# Patient Record
Sex: FEMALE | Race: BLACK OR AFRICAN AMERICAN | Employment: FULL TIME | URBAN - METROPOLITAN AREA
[De-identification: names, ages, dates, MRNs, and addresses within clinical notes are randomized per-mention and may not be internally consistent; named-entity substitution may affect disease eponyms.]

---

## 2021-11-01 LAB — HBA1C MFR BLD HPLC: 5.2 %

## 2022-04-25 ENCOUNTER — OFFICE VISIT (OUTPATIENT)
Dept: INTERNAL MEDICINE CLINIC | Age: 42
End: 2022-04-25
Payer: COMMERCIAL

## 2022-04-25 VITALS
RESPIRATION RATE: 20 BRPM | HEART RATE: 86 BPM | HEIGHT: 68 IN | BODY MASS INDEX: 29.86 KG/M2 | DIASTOLIC BLOOD PRESSURE: 97 MMHG | OXYGEN SATURATION: 99 % | TEMPERATURE: 98.3 F | SYSTOLIC BLOOD PRESSURE: 138 MMHG | WEIGHT: 197 LBS

## 2022-04-25 DIAGNOSIS — L98.9 SKIN ABNORMALITY: ICD-10-CM

## 2022-04-25 DIAGNOSIS — Z98.84 HISTORY OF ROUX-EN-Y GASTRIC BYPASS: Primary | ICD-10-CM

## 2022-04-25 DIAGNOSIS — Z80.0 FAMILY HISTORY OF COLON CANCER: ICD-10-CM

## 2022-04-25 DIAGNOSIS — E66.9 OBESITY (BMI 30-39.9): ICD-10-CM

## 2022-04-25 DIAGNOSIS — I10 PRIMARY HYPERTENSION: ICD-10-CM

## 2022-04-25 DIAGNOSIS — K21.9 GASTROESOPHAGEAL REFLUX DISEASE WITHOUT ESOPHAGITIS: ICD-10-CM

## 2022-04-25 DIAGNOSIS — E26.9 HYPERALDOSTERONISM (HCC): ICD-10-CM

## 2022-04-25 DIAGNOSIS — J30.9 ALLERGIC RHINITIS, UNSPECIFIED SEASONALITY, UNSPECIFIED TRIGGER: ICD-10-CM

## 2022-04-25 DIAGNOSIS — N18.2 STAGE 2 CHRONIC KIDNEY DISEASE: ICD-10-CM

## 2022-04-25 DIAGNOSIS — K64.4 EXTERNAL HEMORRHOID: ICD-10-CM

## 2022-04-25 PROCEDURE — 99204 OFFICE O/P NEW MOD 45 MIN: CPT | Performed by: INTERNAL MEDICINE

## 2022-04-25 RX ORDER — OMEPRAZOLE 20 MG/1
20 CAPSULE, DELAYED RELEASE ORAL DAILY
COMMUNITY
Start: 2022-03-10 | End: 2022-04-25 | Stop reason: SDUPTHER

## 2022-04-25 RX ORDER — LORATADINE 10 MG/1
10 TABLET ORAL DAILY
Qty: 90 TABLET | Refills: 0 | Status: SHIPPED | OUTPATIENT
Start: 2022-04-25 | End: 2022-06-17

## 2022-04-25 RX ORDER — MULTIVITAMIN
2 CAPSULE ORAL DAILY
COMMUNITY
End: 2022-06-17

## 2022-04-25 RX ORDER — OMEPRAZOLE 20 MG/1
20 CAPSULE, DELAYED RELEASE ORAL DAILY
Qty: 90 CAPSULE | Refills: 1 | Status: SHIPPED | OUTPATIENT
Start: 2022-04-25 | End: 2022-06-17

## 2022-04-25 RX ORDER — AMLODIPINE BESYLATE 5 MG/1
10 TABLET ORAL DAILY
COMMUNITY
Start: 2022-03-10 | End: 2022-04-25 | Stop reason: SDUPTHER

## 2022-04-25 RX ORDER — RESVERA/CHROM/GR.TEA/EGCG/DIG3 50MG-20MCG
1 CAPSULE ORAL EVERY OTHER DAY
COMMUNITY
End: 2022-06-17

## 2022-04-25 RX ORDER — SPIRONOLACTONE 100 MG/1
1 TABLET, FILM COATED ORAL DAILY
COMMUNITY
Start: 2022-03-10 | End: 2022-04-25 | Stop reason: SDUPTHER

## 2022-04-25 RX ORDER — SPIRONOLACTONE 100 MG/1
100 TABLET, FILM COATED ORAL DAILY
Qty: 90 TABLET | Refills: 0 | Status: SHIPPED | OUTPATIENT
Start: 2022-04-25 | End: 2022-07-24

## 2022-04-25 RX ORDER — PHENOLPHTHALEIN 90 MG
10 TABLET,CHEWABLE ORAL DAILY
COMMUNITY
Start: 2021-10-21 | End: 2022-04-25 | Stop reason: ALTCHOICE

## 2022-04-25 RX ORDER — AMLODIPINE BESYLATE 10 MG/1
10 TABLET ORAL DAILY
Qty: 90 TABLET | Refills: 1 | Status: SHIPPED | OUTPATIENT
Start: 2022-04-25 | End: 2022-10-22

## 2022-04-25 RX ORDER — ALBUTEROL SULFATE 90 UG/1
2 AEROSOL, METERED RESPIRATORY (INHALATION)
COMMUNITY
Start: 2021-10-06 | End: 2022-06-17 | Stop reason: SDUPTHER

## 2022-04-25 NOTE — PROGRESS NOTES
1. \"Have you been to the ER, urgent care clinic since your last visit? Hospitalized since your last visit? \"  Yes, [facility], [address]    2. \"Have you seen or consulted any other health care providers outside of the [address] [place] [address] since your last visit? \" No     3. For patients aged 39-70: Has the patient had a colonoscopy / FIT/ Cologuard? No      If the patient is female:    4. For patients aged 41-77: Has the patient had a mammogram within the past 2 years? No      5. For patients aged 21-65: Has the patient had a pap smear? Yes, Last PCP. Health Maintenance Due   Topic Date Due    Hepatitis C Screening  Never done    Depression Screen  Never done    Cervical cancer screen  Never done    Lipid Screen  Never done    COVID-19 Vaccine (3 - Booster for Pfizer series) 08/31/2021     Patient here today with Shaila Webb in the room. Patient here to establish care, Patient will need referrals, patient is c/o vomitting, patient has had bariatric done 10/21.

## 2022-04-25 NOTE — PROGRESS NOTES
Tamanna Vaz is a 39 y.o. female  Chief Complaint   Patient presents with    Establish Care    Medication Evaluation    Medication Refill    Skin Problem    Hemorrhoids     Visit Vitals  BP (!) 138/97   Pulse 86   Temp 98.3 °F (36.8 °C) (Temporal)   Resp 20   Ht 5' 7.5\" (1.715 m)   Wt 197 lb (89.4 kg)   SpO2 99%   BMI 30.40 kg/m²          HPI  MsKendrick Mccauley is a 38 yo female with history of Melia-en-Y gastric bypass, Diet controlled DM, CKD HTN, allergic rhinitis, GERD presents today to establish care and refill her medications. She is concerned about nausea and vomiting after the gastric bypass in October 2021, couldn't stand the smell of food. She is on omeprazole and advised to eat small frequent meals instead of large meals. She has a painful rectal swelling, she went to urgent care and was given lidocaine patch and steroid supository with minimal relief, she also had a family history of colon cancer, will refer to GI for colonoscopy and evaluation. She also reports flaking of her skins on face and hands, it appears dry skin vs vitamin/mineral deficiency, will give referral to Dermatology if it continues, she can continue the supplements and cerave craem    She was seen by endocrinology for hypokalemia and hyperaldosteronism, on spirolactone. She also reports 3 lumps in her breast that were cysts and she hasnt had mammogram  Last year which was normal and hasn't had pap smear in few years. She will find an ob gyn on her own. Family history    Maternal grandfather: colon cancer        Social History     Socioeconomic History    Marital status: UNKNOWN   Tobacco Use    Smoking status: Never Smoker    Smokeless tobacco: Never Used   Vaping Use    Vaping Use: Never used   Substance and Sexual Activity    Alcohol use: Not Currently    Drug use: Never    Sexual activity: Yes     Partners: Female     Birth control/protection: None      . History reviewed. No pertinent past medical history. Allergies   Allergen Reactions    Other Plant, Animal, Environmental Sneezing          ROS  Review of Systems   All other systems reviewed and are negative. EXAM  Physical Exam  Vitals reviewed. Constitutional:       General: She is not in acute distress. Appearance: Normal appearance. HENT:      Head: Normocephalic and atraumatic. Mouth/Throat:      Mouth: Mucous membranes are moist.      Pharynx: Oropharynx is clear. Eyes:      Extraocular Movements: Extraocular movements intact. Cardiovascular:      Rate and Rhythm: Normal rate and regular rhythm. Heart sounds: No murmur heard. No gallop. Pulmonary:      Effort: No respiratory distress. Breath sounds: Normal breath sounds. No wheezing or rales. Abdominal:      General: Bowel sounds are normal. There is no distension. Palpations: There is no mass. Tenderness: There is no abdominal tenderness. There is no guarding. Musculoskeletal:         General: No swelling, tenderness or deformity. Normal range of motion. Right lower leg: No edema. Left lower leg: No edema. Skin:     Findings: No lesion. Neurological:      General: No focal deficit present. Mental Status: She is alert. Motor: No weakness. Psychiatric:         Mood and Affect: Mood normal.         Health Maintenance Due   Topic Date Due    Hepatitis C Screening  Never done    Cervical cancer screen  Never done    COVID-19 Vaccine (3 - Booster for Pfizer series) 08/31/2021       ASSESSMENT/PLAN    Diagnoses and all orders for this visit:    1. History of Melia-en-Y gastric bypass  -     REFERRAL TO GENERAL SURGERY    2. External hemorrhoid  -     REFERRAL TO GASTROENTEROLOGY    3. Family history of colon cancer  -     REFERRAL TO GASTROENTEROLOGY    4. Skin abnormality  -     REFERRAL TO DERMATOLOGY    5. Primary hypertension  -     amLODIPine (NORVASC) 10 mg tablet; Take 1 Tablet by mouth daily for 180 days.   - spironolactone (ALDACTONE) 100 mg tablet; Take 1 Tablet by mouth daily for 90 days.  -     METABOLIC PANEL, BASIC; Future  -     LIPID PANEL; Future    6. Gastroesophageal reflux disease without esophagitis  -     omeprazole (PRILOSEC) 20 mg capsule; Take 1 Capsule by mouth daily for 180 days. 7. Hyperaldosteronism (HCC)  -     spironolactone (ALDACTONE) 100 mg tablet; Take 1 Tablet by mouth daily for 90 days. 8. Stage 2 chronic kidney disease  -     METABOLIC PANEL, BASIC; Future    9. Obesity (BMI 30-39.9)  -     LIPID PANEL; Future    10. Allergic rhinitis, unspecified seasonality, unspecified trigger  -     loratadine (CLARITIN) 10 mg tablet; Take 1 Tablet by mouth daily for 90 days.       -healthy life style , such as healthy balance low calorie diet and exercise advised   Return in 3 month tere Abraham MD

## 2022-05-02 ENCOUNTER — TELEPHONE (OUTPATIENT)
Dept: BARIATRICS/WEIGHT MGMT | Age: 42
End: 2022-05-02

## 2022-05-02 NOTE — TELEPHONE ENCOUNTER
Patient contact the office asking to establish bariatric follow up care. Melia-en-y gastric bypass 2021 at [facility]. I advised the patient to obtain records from previous office and have them faxed to us. Once received, we can schedule an appointment with a provider. Pt understands.

## 2022-05-03 ENCOUNTER — TELEPHONE (OUTPATIENT)
Dept: INTERNAL MEDICINE CLINIC | Age: 42
End: 2022-05-03

## 2022-05-03 NOTE — TELEPHONE ENCOUNTER
PATIENT IS CALLING TO SWITCH HER PCP FROM DR MACARIO [facility] TO ANOTHER PROVIDER AT [facility]. SHE IS UNCOMFORTABLE WITH  [facility] AND WANTS TO CHANGE ASAP.

## 2022-05-03 NOTE — TELEPHONE ENCOUNTER
----- Message from Shadia Alvarado sent at 5/2/2022 12:31 PM EDT -----  Subject: Message to Provider    QUESTIONS  Information for Provider? PATIENT IS CALLING TO SWITCH HER PCP FROM    [facility] TO ANOTHER PROVIDER AT [facility]. SHE IS UNCOMFORTABLE   WITH  [facility] AND WANTS TO CHANGE ASAP.   ---------------------------------------------------------------------------  --------------  CALL BACK INFO  What is the best way for the office to contact you? OK to leave message on   voicemail  Preferred Call Back Phone Number? [phone]  ---------------------------------------------------------------------------  --------------  SCRIPT ANSWERS  Relationship to Patient?  Self

## 2022-06-17 PROBLEM — E11.9 DIET-CONTROLLED DIABETES MELLITUS (HCC): Status: ACTIVE | Noted: 2020-11-10

## 2022-06-17 PROBLEM — N18.9 CKD (CHRONIC KIDNEY DISEASE): Status: ACTIVE | Noted: 2022-06-17

## 2022-08-04 LAB — HBA1C MFR BLD HPLC: 4.8 %
